# Patient Record
Sex: MALE | URBAN - METROPOLITAN AREA
[De-identification: names, ages, dates, MRNs, and addresses within clinical notes are randomized per-mention and may not be internally consistent; named-entity substitution may affect disease eponyms.]

---

## 2017-09-07 ENCOUNTER — IMPORTED ENCOUNTER (OUTPATIENT)
Dept: URBAN - METROPOLITAN AREA CLINIC 9 | Facility: CLINIC | Age: 47
End: 2017-09-07

## 2018-10-18 ENCOUNTER — IMPORTED ENCOUNTER (OUTPATIENT)
Dept: URBAN - METROPOLITAN AREA CLINIC 9 | Facility: CLINIC | Age: 48
End: 2018-10-18

## 2020-07-09 ENCOUNTER — IMPORTED ENCOUNTER (OUTPATIENT)
Dept: URBAN - METROPOLITAN AREA CLINIC 9 | Facility: CLINIC | Age: 50
End: 2020-07-09

## 2020-09-23 NOTE — PATIENT DISCUSSION
[facility] Visual Field 120 point screen: I have reviewed the visual field on both eyes which show the following limitation of a peripheral visual field: small 10-15 degrees temporal arcuate loss OD, normal OS.

## 2020-09-23 NOTE — PATIENT DISCUSSION
PHOTOGRAPHS: I have reviewed the external ocular photographs of this patient which show the following: no significant proptosis of either eye. none

## 2021-10-17 ASSESSMENT — KERATOMETRY
OS_AXISANGLE_DEGREES: 84
OD_K2POWER_DIOPTERS: 47.25
OD_K2POWER_DIOPTERS: 47
OD_AXISANGLE2_DEGREES: 172
OS_K2POWER_DIOPTERS: 46
OS_K2POWER_DIOPTERS: 47
OD_AXISANGLE_DEGREES: 173
OD_K1POWER_DIOPTERS: 45.25
OS_K1POWER_DIOPTERS: 45.75
OD_AXISANGLE2_DEGREES: 83
OD_AXISANGLE_DEGREES: 82
OS_AXISANGLE2_DEGREES: 79
OD_AXISANGLE_DEGREES: 173
OS_K1POWER_DIOPTERS: 45.5
OD_K1POWER_DIOPTERS: 45.75
OS_AXISANGLE_DEGREES: 173
OD_AXISANGLE2_DEGREES: 83
OD_K1POWER_DIOPTERS: 45.25
OS_AXISANGLE2_DEGREES: 174
OD_K2POWER_DIOPTERS: 47.5
OS_K2POWER_DIOPTERS: 46.5
OS_K1POWER_DIOPTERS: 45.5
OS_AXISANGLE2_DEGREES: 83
OS_AXISANGLE_DEGREES: 169

## 2021-10-17 ASSESSMENT — VISUAL ACUITY
OD_CC: 20/25 - SN
OD_CC: 20/25 -2 SN
OS_CC: 20/20 SN
OD_SC: 20/25 - SN
OD_CC: 20/20 SN
OS_CC: 20/25 SN
OS_CC: 20/20 SN
OD_CC: 20/20 - SN
OS_CC: 20/20 SN
OS_CC: 20/25 - SN
OD_SC: 20/60 - SN
OS_SC: 20/40 - SN
OD_CC: 20/25 SN
OS_SC: 20/25 - SN
OD_CC: 20/20 -2 SN
OD_CC: 20/20 SN

## 2021-10-17 ASSESSMENT — TONOMETRY
OS_IOP_MMHG: 15
OS_IOP_MMHG: 13
OS_IOP_MMHG: 12
OD_IOP_MMHG: 12
OD_IOP_MMHG: 15
OD_IOP_MMHG: 14

## 2022-07-18 ENCOUNTER — ESTABLISHED PATIENT (OUTPATIENT)
Dept: URBAN - METROPOLITAN AREA CLINIC 4 | Facility: CLINIC | Age: 52
End: 2022-07-18

## 2022-07-18 DIAGNOSIS — H11.153: ICD-10-CM

## 2022-07-18 DIAGNOSIS — E11.9: ICD-10-CM

## 2022-07-18 DIAGNOSIS — H02.125: ICD-10-CM

## 2022-07-18 DIAGNOSIS — H02.122: ICD-10-CM

## 2022-07-18 DIAGNOSIS — H02.831: ICD-10-CM

## 2022-07-18 DIAGNOSIS — H02.834: ICD-10-CM

## 2022-07-18 PROCEDURE — 92015 DETERMINE REFRACTIVE STATE: CPT

## 2022-07-18 PROCEDURE — 92014 COMPRE OPH EXAM EST PT 1/>: CPT

## 2022-07-18 ASSESSMENT — VISUAL ACUITY
OS_CC: 20/25
OU_CC: 20/25
OS_GLARE: 20/50-2
OD_GLARE: 20/60-2
OD_CC: 20/25-2

## 2022-07-18 ASSESSMENT — KERATOMETRY
OS_AXISANGLE2_DEGREES: 161
OS_AXISANGLE_DEGREES: 71
OD_K2POWER_DIOPTERS: 47
OS_K2POWER_DIOPTERS: 46.75
OS_K1POWER_DIOPTERS: 45.50
OD_K1POWER_DIOPTERS: 45.50
OD_AXISANGLE2_DEGREES: 83
OD_AXISANGLE_DEGREES: 173

## 2022-07-18 ASSESSMENT — TONOMETRY
OS_IOP_MMHG: 15
OD_IOP_MMHG: 15

## 2024-10-31 ENCOUNTER — COMPREHENSIVE EXAM (OUTPATIENT)
Facility: LOCATION | Age: 54
End: 2024-10-31

## 2024-10-31 DIAGNOSIS — H02.833: ICD-10-CM

## 2024-10-31 DIAGNOSIS — H11.153: ICD-10-CM

## 2024-10-31 DIAGNOSIS — H02.834: ICD-10-CM

## 2024-10-31 DIAGNOSIS — E11.9: ICD-10-CM

## 2024-10-31 DIAGNOSIS — H25.13: ICD-10-CM

## 2024-10-31 DIAGNOSIS — H02.831: ICD-10-CM

## 2024-10-31 DIAGNOSIS — H02.836: ICD-10-CM

## 2024-10-31 PROCEDURE — 92014 COMPRE OPH EXAM EST PT 1/>: CPT

## 2024-10-31 PROCEDURE — 92015 DETERMINE REFRACTIVE STATE: CPT
